# Patient Record
Sex: FEMALE | Race: WHITE
[De-identification: names, ages, dates, MRNs, and addresses within clinical notes are randomized per-mention and may not be internally consistent; named-entity substitution may affect disease eponyms.]

---

## 2017-01-04 ENCOUNTER — HOSPITAL ENCOUNTER (OUTPATIENT)
Dept: HOSPITAL 45 - C.LABSPEC | Age: 42
Discharge: HOME | End: 2017-01-04
Attending: OBSTETRICS & GYNECOLOGY
Payer: COMMERCIAL

## 2017-01-04 DIAGNOSIS — O09.819: Primary | ICD-10-CM

## 2017-01-04 DIAGNOSIS — Z3A.00: ICD-10-CM

## 2017-01-04 LAB
APPEARANCE UR: CLEAR
BILIRUB UR-MCNC: (no result) MG/DL
COLOR UR: YELLOW
MANUAL MICROSCOPIC REQUIRED?: NO
NITRITE UR QL STRIP: (no result)
PH UR STRIP: 7 [PH] (ref 4.5–7.5)
REVIEW REQ?: NO
SP GR UR STRIP: 1.02 (ref 1–1.03)
URINE BILL WITH OR WITHOUT MIC: (no result)
UROBILINOGEN UR-MCNC: (no result) MG/DL

## 2017-01-06 ENCOUNTER — HOSPITAL ENCOUNTER (OUTPATIENT)
Dept: HOSPITAL 45 - C.PAPS | Age: 42
Discharge: HOME | End: 2017-01-06
Attending: OBSTETRICS & GYNECOLOGY
Payer: COMMERCIAL

## 2017-01-06 ENCOUNTER — HOSPITAL ENCOUNTER (OUTPATIENT)
Dept: HOSPITAL 45 - C.LAB1850 | Age: 42
Discharge: HOME | End: 2017-01-06
Attending: OBSTETRICS & GYNECOLOGY
Payer: COMMERCIAL

## 2017-01-06 DIAGNOSIS — Z12.4: Primary | ICD-10-CM

## 2017-01-06 DIAGNOSIS — O09.521: Primary | ICD-10-CM

## 2017-01-06 LAB
BASOPHILS # BLD: 0.03 K/UL (ref 0–0.2)
BASOPHILS NFR BLD: 0.2 %
COMPLETE: YES
EOSINOPHIL NFR BLD AUTO: 235 K/UL (ref 130–400)
HCT VFR BLD CALC: 38.3 % (ref 37–47)
IG%: 0.2 %
IMM GRANULOCYTES NFR BLD AUTO: 29.2 %
LYMPHOCYTES # BLD: 3.91 K/UL (ref 1.2–3.4)
MCH RBC QN AUTO: 28.8 PG (ref 25–34)
MCHC RBC AUTO-ENTMCNC: 36 G/DL (ref 32–36)
MCV RBC AUTO: 80 FL (ref 80–100)
MONOCYTES NFR BLD: 6 %
NEUTROPHILS # BLD AUTO: 5.2 %
NEUTROPHILS NFR BLD AUTO: 59.2 %
PMV BLD AUTO: 10.6 FL (ref 7.4–10.4)
RBC # BLD AUTO: 4.79 M/UL (ref 4.2–5.4)
WBC # BLD AUTO: 13.41 K/UL (ref 4.8–10.8)

## 2017-01-10 LAB
CHLAMYDIA TRACH RNA***: NOT DETECTED
GC (NEIS GONORRHOEAE)RNA**: NOT DETECTED

## 2017-02-23 ENCOUNTER — HOSPITAL ENCOUNTER (OUTPATIENT)
Dept: HOSPITAL 45 - C.LABBC | Age: 42
Discharge: HOME | End: 2017-02-23
Attending: FAMILY MEDICINE
Payer: COMMERCIAL

## 2017-02-23 DIAGNOSIS — O99.810: Primary | ICD-10-CM

## 2017-02-23 LAB — EST. AVERAGE GLUCOSE BLD GHB EST-MCNC: 105 MG/DL

## 2017-02-24 NOTE — CODING QUERY NO DIAGNOSIS
TREATMENT RENDERED WITHOUT A DIAGNOSIS                                                  



To promote full compliance with coding requirements relating to patient care, physician 
participation is requested in all cases of  uncertainty.  Please assist us with 
providing a diagnosis/symptom for the test(s) below:



A diagnosis/symptom was not documented on your Order.  A valid diagnosis/symptom is 
required to bill all insurances.



**Please remember that we are unable to code a diagnosis of rule out, probable, possible, 
questionable, or suspected.  



Tests that require a diagnosis:



DOS: 2/23/17

* HEMOGLOBIN A1C      DIAGNOSIS:

* VITAMIN B12             DIAGNOSIS:

* VITAMIN D               DIAGNOSIS:





Provider Signature: _____________________________ Date: _________



Thank you  

Hue Hallman

[facility] Information Management

Phone:  [phone]

Fax:  [phone]



Once completed, please kindly fax back to [phone]



For questions please call [phone]

## 2017-05-05 ENCOUNTER — HOSPITAL ENCOUNTER (OUTPATIENT)
Dept: HOSPITAL 45 - C.LABBC | Age: 42
Discharge: HOME | End: 2017-05-05
Attending: FAMILY MEDICINE
Payer: COMMERCIAL

## 2017-05-05 DIAGNOSIS — E03.0: ICD-10-CM

## 2017-05-05 DIAGNOSIS — E11.9: Primary | ICD-10-CM

## 2017-05-05 LAB
ALBUMIN/GLOB SERPL: 0.8 {RATIO} (ref 0.9–2)
ALP SERPL-CCNC: 55 U/L (ref 45–117)
ALT SERPL-CCNC: 24 U/L (ref 12–78)
ANION GAP SERPL CALC-SCNC: 10 MMOL/L (ref 3–11)
AST SERPL-CCNC: 13 U/L (ref 15–37)
BUN SERPL-MCNC: 11 MG/DL (ref 7–18)
BUN/CREAT SERPL: 14.6 (ref 10–20)
CALCIUM SERPL-MCNC: 9.4 MG/DL (ref 8.5–10.1)
CHLORIDE SERPL-SCNC: 109 MMOL/L (ref 98–107)
CO2 SERPL-SCNC: 21 MMOL/L (ref 21–32)
CREAT SERPL-MCNC: 0.74 MG/DL (ref 0.6–1.2)
EOSINOPHIL NFR BLD AUTO: 219 K/UL (ref 130–400)
GLOBULIN SER-MCNC: 3.5 GM/DL (ref 2.5–4)
GLUCOSE SERPL-MCNC: 86 MG/DL (ref 70–99)
HCT VFR BLD CALC: 40 % (ref 37–47)
MCH RBC QN AUTO: 28.8 PG (ref 25–34)
MCHC RBC AUTO-ENTMCNC: 34.8 G/DL (ref 32–36)
MCV RBC AUTO: 83 FL (ref 80–100)
PMV BLD AUTO: 10.6 FL (ref 7.4–10.4)
POTASSIUM SERPL-SCNC: 4 MMOL/L (ref 3.5–5.1)
RBC # BLD AUTO: 4.82 M/UL (ref 4.2–5.4)
SODIUM SERPL-SCNC: 140 MMOL/L (ref 136–145)
TSH SERPL-ACNC: < 0.005 UIU/ML (ref 0.3–4.5)
WBC # BLD AUTO: 12.39 K/UL (ref 4.8–10.8)

## 2017-05-17 ENCOUNTER — HOSPITAL ENCOUNTER (OUTPATIENT)
Dept: HOSPITAL 45 - C.LABSPEC | Age: 42
Discharge: HOME | End: 2017-05-17
Attending: OBSTETRICS & GYNECOLOGY
Payer: COMMERCIAL

## 2017-05-17 ENCOUNTER — HOSPITAL ENCOUNTER (OUTPATIENT)
Dept: HOSPITAL 45 - C.LAB1850 | Age: 42
Discharge: HOME | End: 2017-05-17
Attending: OBSTETRICS & GYNECOLOGY
Payer: COMMERCIAL

## 2017-05-17 DIAGNOSIS — O09.523: Primary | ICD-10-CM

## 2017-05-17 LAB
APPEARANCE UR: CLEAR
BILIRUB UR-MCNC: (no result) MG/DL
COLOR UR: YELLOW
HCT VFR BLD CALC: 40.5 % (ref 37–47)
MANUAL MICROSCOPIC REQUIRED?: NO
NITRITE UR QL STRIP: (no result)
PH UR STRIP: 5.5 [PH] (ref 4.5–7.5)
REVIEW REQ?: NO
SP GR UR STRIP: 1.02 (ref 1–1.03)
URINE EPITHELIAL CELL AUTO: >30 /LPF (ref 0–5)
UROBILINOGEN UR-MCNC: (no result) MG/DL

## 2017-06-22 ENCOUNTER — HOSPITAL ENCOUNTER (OUTPATIENT)
Dept: HOSPITAL 45 - C.LAB1850 | Age: 42
Discharge: HOME | End: 2017-06-22
Attending: OBSTETRICS & GYNECOLOGY
Payer: COMMERCIAL

## 2017-06-22 DIAGNOSIS — O13.3: Primary | ICD-10-CM

## 2017-06-22 LAB
ALP SERPL-CCNC: 66 U/L (ref 45–117)
ALT SERPL-CCNC: 18 U/L (ref 12–78)
AST SERPL-CCNC: 10 U/L (ref 15–37)
EOSINOPHIL NFR BLD AUTO: 185 K/UL (ref 130–400)
HCT VFR BLD CALC: 40.2 % (ref 37–47)
MCH RBC QN AUTO: 27.9 PG (ref 25–34)
MCHC RBC AUTO-ENTMCNC: 34.1 G/DL (ref 32–36)
MCV RBC AUTO: 81.9 FL (ref 80–100)
PMV BLD AUTO: 10.1 FL (ref 7.4–10.4)
RBC # BLD AUTO: 4.91 M/UL (ref 4.2–5.4)
WBC # BLD AUTO: 12.43 K/UL (ref 4.8–10.8)

## 2017-06-28 ENCOUNTER — HOSPITAL ENCOUNTER (OUTPATIENT)
Dept: HOSPITAL 45 - C.LAB1850 | Age: 42
Discharge: HOME | End: 2017-06-28
Attending: OBSTETRICS & GYNECOLOGY
Payer: COMMERCIAL

## 2017-06-28 DIAGNOSIS — O13.3: Primary | ICD-10-CM

## 2017-06-28 LAB
ALP SERPL-CCNC: 87 U/L (ref 45–117)
ALT SERPL-CCNC: 21 U/L (ref 12–78)
AST SERPL-CCNC: 14 U/L (ref 15–37)
CREAT SERPL-MCNC: 0.86 MG/DL (ref 0.6–1.2)
EOSINOPHIL NFR BLD AUTO: 197 K/UL (ref 130–400)
HCT VFR BLD CALC: 39 % (ref 37–47)
MCH RBC QN AUTO: 28.2 PG (ref 25–34)
MCHC RBC AUTO-ENTMCNC: 34.9 G/DL (ref 32–36)
MCV RBC AUTO: 80.7 FL (ref 80–100)
PMV BLD AUTO: 10.3 FL (ref 7.4–10.4)
RBC # BLD AUTO: 4.83 M/UL (ref 4.2–5.4)
URATE SERPL-MCNC: 3.8 MG/DL (ref 2.6–7.2)
WBC # BLD AUTO: 11.74 K/UL (ref 4.8–10.8)

## 2017-07-05 ENCOUNTER — HOSPITAL ENCOUNTER (OUTPATIENT)
Dept: HOSPITAL 45 - C.LAB1850 | Age: 42
Discharge: HOME | End: 2017-07-05
Attending: OBSTETRICS & GYNECOLOGY
Payer: COMMERCIAL

## 2017-07-05 DIAGNOSIS — O13.3: Primary | ICD-10-CM

## 2017-07-05 LAB
ALP SERPL-CCNC: 75 U/L (ref 45–117)
ALT SERPL-CCNC: 18 U/L (ref 12–78)
AST SERPL-CCNC: 12 U/L (ref 15–37)
EOSINOPHIL NFR BLD AUTO: 175 K/UL (ref 130–400)
HCT VFR BLD CALC: 38.4 % (ref 37–47)
MCH RBC QN AUTO: 28.6 PG (ref 25–34)
MCHC RBC AUTO-ENTMCNC: 34.9 G/DL (ref 32–36)
MCV RBC AUTO: 81.9 FL (ref 80–100)
PMV BLD AUTO: 10.9 FL (ref 7.4–10.4)
RBC # BLD AUTO: 4.69 M/UL (ref 4.2–5.4)
WBC # BLD AUTO: 10.42 K/UL (ref 4.8–10.8)

## 2017-07-12 ENCOUNTER — HOSPITAL ENCOUNTER (OUTPATIENT)
Dept: HOSPITAL 45 - C.LAB1850 | Age: 42
Discharge: HOME | End: 2017-07-12
Attending: OBSTETRICS & GYNECOLOGY
Payer: COMMERCIAL

## 2017-07-12 DIAGNOSIS — O13.3: Primary | ICD-10-CM

## 2017-07-12 LAB
ALT SERPL-CCNC: 17 U/L (ref 12–78)
AST SERPL-CCNC: 12 U/L (ref 15–37)
BASOPHILS # BLD: 0.02 K/UL (ref 0–0.2)
BASOPHILS NFR BLD: 0.2 %
COMPLETE: YES
CREAT SERPL-MCNC: 0.83 MG/DL (ref 0.6–1.2)
EOSINOPHIL NFR BLD AUTO: 167 K/UL (ref 130–400)
HCT VFR BLD CALC: 38.9 % (ref 37–47)
IG%: 0.4 %
IMM GRANULOCYTES NFR BLD AUTO: 24.8 %
LYMPHOCYTES # BLD: 2.7 K/UL (ref 1.2–3.4)
MCH RBC QN AUTO: 28.2 PG (ref 25–34)
MCHC RBC AUTO-ENTMCNC: 34.4 G/DL (ref 32–36)
MCV RBC AUTO: 81.9 FL (ref 80–100)
MONOCYTES NFR BLD: 6 %
NEUTROPHILS # BLD AUTO: 1.3 %
NEUTROPHILS NFR BLD AUTO: 67.3 %
PMV BLD AUTO: 11 FL (ref 7.4–10.4)
RBC # BLD AUTO: 4.75 M/UL (ref 4.2–5.4)
URATE SERPL-MCNC: 4.2 MG/DL (ref 2.6–7.2)
WBC # BLD AUTO: 10.87 K/UL (ref 4.8–10.8)

## 2017-07-18 ENCOUNTER — HOSPITAL ENCOUNTER (OUTPATIENT)
Dept: HOSPITAL 45 - C.OPB | Age: 42
Discharge: HOME | End: 2017-07-18
Attending: OBSTETRICS & GYNECOLOGY
Payer: COMMERCIAL

## 2017-07-18 DIAGNOSIS — O26.893: Primary | ICD-10-CM

## 2017-07-18 DIAGNOSIS — Z3A.36: ICD-10-CM

## 2017-07-18 LAB
ALBUMIN/GLOB SERPL: 0.6 {RATIO} (ref 0.9–2)
ALP SERPL-CCNC: 79 U/L (ref 45–117)
ALT SERPL-CCNC: 19 U/L (ref 12–78)
ANION GAP SERPL CALC-SCNC: 7 MMOL/L (ref 3–11)
AST SERPL-CCNC: 12 U/L (ref 15–37)
BASOPHILS # BLD: 0.01 K/UL (ref 0–0.2)
BASOPHILS NFR BLD: 0.1 %
BUN SERPL-MCNC: 11 MG/DL (ref 7–18)
BUN/CREAT SERPL: 13.7 (ref 10–20)
CALCIUM SERPL-MCNC: 8.8 MG/DL (ref 8.5–10.1)
CHLORIDE SERPL-SCNC: 109 MMOL/L (ref 98–107)
CO2 SERPL-SCNC: 24 MMOL/L (ref 21–32)
COMPLETE: YES
CREAT SERPL-MCNC: 0.79 MG/DL (ref 0.6–1.2)
EOSINOPHIL NFR BLD AUTO: 146 K/UL (ref 130–400)
GLOBULIN SER-MCNC: 3.7 GM/DL (ref 2.5–4)
GLUCOSE SERPL-MCNC: 72 MG/DL (ref 70–99)
HCT VFR BLD CALC: 38.3 % (ref 37–47)
IG%: 0.3 %
IMM GRANULOCYTES NFR BLD AUTO: 24.7 %
LYMPHOCYTES # BLD: 2.47 K/UL (ref 1.2–3.4)
MCH RBC QN AUTO: 28.5 PG (ref 25–34)
MCHC RBC AUTO-ENTMCNC: 35 G/DL (ref 32–36)
MCV RBC AUTO: 81.5 FL (ref 80–100)
MONOCYTES NFR BLD: 7.4 %
NEUTROPHILS # BLD AUTO: 1.6 %
NEUTROPHILS NFR BLD AUTO: 65.9 %
PMV BLD AUTO: 10.8 FL (ref 7.4–10.4)
POTASSIUM SERPL-SCNC: 4 MMOL/L (ref 3.5–5.1)
RBC # BLD AUTO: 4.7 M/UL (ref 4.2–5.4)
SODIUM SERPL-SCNC: 140 MMOL/L (ref 136–145)
URATE SERPL-MCNC: 4.1 MG/DL (ref 2.6–7.2)
WBC # BLD AUTO: 10.02 K/UL (ref 4.8–10.8)

## 2017-07-19 ENCOUNTER — HOSPITAL ENCOUNTER (OUTPATIENT)
Dept: HOSPITAL 45 - C.LD | Age: 42
Discharge: HOME | End: 2017-07-19
Attending: OBSTETRICS & GYNECOLOGY
Payer: COMMERCIAL

## 2017-07-19 VITALS
HEIGHT: 67.01 IN | BODY MASS INDEX: 45.99 KG/M2 | WEIGHT: 293 LBS | HEIGHT: 67.01 IN | WEIGHT: 293 LBS | BODY MASS INDEX: 45.99 KG/M2

## 2017-07-19 DIAGNOSIS — O09.523: ICD-10-CM

## 2017-07-19 DIAGNOSIS — Z3A.37: ICD-10-CM

## 2017-07-19 DIAGNOSIS — O13.3: Primary | ICD-10-CM

## 2017-07-20 ENCOUNTER — HOSPITAL ENCOUNTER (INPATIENT)
Dept: HOSPITAL 45 - C.LD | Age: 42
LOS: 2 days | Discharge: HOME | End: 2017-07-22
Attending: OBSTETRICS & GYNECOLOGY | Admitting: OBSTETRICS & GYNECOLOGY
Payer: COMMERCIAL

## 2017-07-20 VITALS
BODY MASS INDEX: 45.99 KG/M2 | HEIGHT: 67.01 IN | WEIGHT: 293 LBS | HEIGHT: 67.01 IN | WEIGHT: 293 LBS | BODY MASS INDEX: 45.99 KG/M2

## 2017-07-20 DIAGNOSIS — O24.414: ICD-10-CM

## 2017-07-20 DIAGNOSIS — O13.3: Primary | ICD-10-CM

## 2017-07-20 DIAGNOSIS — O09.523: ICD-10-CM

## 2017-07-20 DIAGNOSIS — Z3A.37: ICD-10-CM

## 2017-07-20 LAB
ALBUMIN/GLOB SERPL: 0.7 {RATIO} (ref 0.9–2)
ALP SERPL-CCNC: 79 U/L (ref 45–117)
ALT SERPL-CCNC: 21 U/L (ref 12–78)
ANION GAP SERPL CALC-SCNC: 7 MMOL/L (ref 3–11)
AST SERPL-CCNC: 16 U/L (ref 15–37)
BUN SERPL-MCNC: 12 MG/DL (ref 7–18)
BUN/CREAT SERPL: 15.1 (ref 10–20)
CALCIUM SERPL-MCNC: 8.6 MG/DL (ref 8.5–10.1)
CHLORIDE SERPL-SCNC: 111 MMOL/L (ref 98–107)
CO2 SERPL-SCNC: 23 MMOL/L (ref 21–32)
CREAT SERPL-MCNC: 0.81 MG/DL (ref 0.6–1.2)
EOSINOPHIL NFR BLD AUTO: 161 K/UL (ref 130–400)
GLOBULIN SER-MCNC: 3.5 GM/DL (ref 2.5–4)
GLUCOSE SERPL-MCNC: 72 MG/DL (ref 70–99)
HCT VFR BLD CALC: 38.4 % (ref 37–47)
MCH RBC QN AUTO: 28.3 PG (ref 25–34)
MCHC RBC AUTO-ENTMCNC: 34.4 G/DL (ref 32–36)
MCV RBC AUTO: 82.4 FL (ref 80–100)
PMV BLD AUTO: 10.5 FL (ref 7.4–10.4)
POTASSIUM SERPL-SCNC: 3.9 MMOL/L (ref 3.5–5.1)
RBC # BLD AUTO: 4.66 M/UL (ref 4.2–5.4)
SODIUM SERPL-SCNC: 141 MMOL/L (ref 136–145)
WBC # BLD AUTO: 10.12 K/UL (ref 4.8–10.8)

## 2017-07-20 RX ADMIN — HUMAN INSULIN SCH MLS/HR: 100 INJECTION, SOLUTION SUBCUTANEOUS at 18:36

## 2017-07-20 RX ADMIN — HUMAN INSULIN SCH MLS/HR: 100 INJECTION, SOLUTION SUBCUTANEOUS at 16:26

## 2017-07-20 RX ADMIN — HUMAN INSULIN SCH MLS/HR: 100 INJECTION, SOLUTION SUBCUTANEOUS at 21:35

## 2017-07-20 RX ADMIN — SODIUM CHLORIDE, SODIUM LACTATE, POTASSIUM CHLORIDE, AND CALCIUM CHLORIDE SCH MLS/HR: 600; 310; 30; 20 INJECTION, SOLUTION INTRAVENOUS at 09:59

## 2017-07-20 RX ADMIN — DEXTROSE MONOHYDRATE SCH MLS/HR: 50 INJECTION, SOLUTION INTRAVENOUS at 19:59

## 2017-07-20 RX ADMIN — DEXTROSE MONOHYDRATE SCH MLS/HR: 50 INJECTION, SOLUTION INTRAVENOUS at 09:53

## 2017-07-20 RX ADMIN — HUMAN INSULIN SCH MLS/HR: 100 INJECTION, SOLUTION SUBCUTANEOUS at 11:51

## 2017-07-20 RX ADMIN — SODIUM CHLORIDE, SODIUM LACTATE, POTASSIUM CHLORIDE, AND CALCIUM CHLORIDE SCH MLS/HR: 600; 310; 30; 20 INJECTION, SOLUTION INTRAVENOUS at 23:47

## 2017-07-20 NOTE — MEDICAL STUDENT: [facility]
Med Student History & Physical


Date of Service


2017.





Chief Complaint


Induction of labor





History of Present Illness


Source:  patient, clinic records, hospital records


Margarita Arroyo is a 41 year old  at 37 weeks gestation with JACK of 8/10/17 

via U/S on 16  who presents to labor and delivery today for induction of 

labor.





The prenatal course was complicated by the patient being an elderly 

multigravida with:


1. hypothyroidism, 


2. exercise-induced asthma, 


3. third trimester gestational hypertension without proteinuria, and


4.  gestational diabetes that is insulin controlled.  





She is rubella immune.  She is GBS status not reported. She is Rh negative and 

will need rhogam.  This pregnancy resulted from assisted reproductive 

technology involving ICSI and a 32 yr old donor egg.  She declined a quad 

screen.  A fetal echo at between 22-24 weeks gestation showed probably small 

membranous VSD.  The patient received a growth ultrasound every 4 weeks after 

28 weeks to access fetal growth. Patient received weekly AFIs at 36 weeks and 

weekly NSTs at 32 weeks then NSTs twice a week at 36 weeks. NSTs on 6/15, , 

, ,  were reactive. She received prolonged NST monitoring  and a BPP 

after a nonreactive NST on   at around 36.5 weeks with a BPP score of 8/8. 

It is recommended she deliver between 37-38 weeks gestation due to gestational 

hypertension she developed in the third trimester.  As she has progressed to 

term, there has been an increasingly difficult time monitoring fetal heart rate 

externally.  This is complicated in part due to the patient's obesity.





Yesterday, she recived a cervical balloon dilator.  This morning she received 

25 mcg misoprostol for cervical ripening.  Today, due to difficult to detect 

fetal movement via external heart rate monitors, she was counciled about 

delivery options including , waiting for SROM w/ , or AROM with 

fetal scalp electrode for induction of labor.  After discussion of risks and 

benefits, she opted for AROM with implation of fetal scalp electrode for 

difficult to detect fetal heart rate.





OB History


Patient is a : 01, Operative Vaginal delivery with vacuum at 42 weeks Gestation, Male 

infant 8lbs. 15 oz., comments: epidural anesthesia, GBS+


G2: 03, Spontaneous  at 24 weeks gestation of a nonviable male 

infant with bilateral renal agenesis


G3: 3/14/09, Normal spontaneous vaginal delivery at 38 weeks gestation, Female 

infant 7 lbs.15 oz., comments: Gest. Diabetes Mellitis, Bell's Palsy





GYN History


Menarche age 12, menstrual cycles are irregular, LMP 16 , last pap 2015 

was within normal limits





No infection history of herpes, hepatitisB/C, gonorrhea, chlamydia, HPV, HIV, 

Syphilis, or other STI.





Past Medical History


Patient has a history of Gestational diabetes mellitus, hypothyroidism, MTFHR 

gene mutation, obesity, and infertility (current pregnancy via IVF/ICSI).





Past Surgical History


[place] teeth removal surgery, tonsillectomy





Family History


Family history of Diabetes Mellitus, heart disease, hypertension, high 

cholesterol, liver disease, osteoporosis, fibroids





Social History


non-contributory


Smoking Status:  Never Smoker


Smokeless Tobacco Use:  No


Alcohol Use:  none


Drug Use:  none


Marital Status:  


Housing status:  lives with family


Occupational Status:  employed





Allergies


Coded Allergies:  


     Iodine (Verified  Allergy, Severe, SEAFOOD-ANAPHYLAXIS, 17)





Home Medications


Insulin Human NPH (Humulin N), 110 SC HS


Insulin Lispro (Human) (Humalog), 60 AC


Levothyroxine Sodium (Tirosint), 250


Liothyronine Sodium (Cytomel), 30 MCG PO DAILY





Review of Systems


Contractions at current time are clearly identifiable by patient, but not 

causing distress or change in habitus, breathing, or speech.





Physical Exam


Vital Signs:


recorded 14:35 today: /92, Pulse 73, Resp rate 20, Temp 37.4C (99.3F)


General Appearance:  WD/WN, no apparent distress


Fundal Height:  term


Abdominal: gravid abdomen, fundal height is term


:  Dilation: 2; Effacement 50%; Station: -2 (per Dr. Nelson); fetus lie is 

vertex


Estimated fetal weight: 8lbs 4 oz.





Fetal Monitoring


External Fetal Monitor:


Fetal scalp electrode = fetal heart rate in 130s-140s, moderate variability, 

accels present, no decels. noted at current time, Category 1.


Tocodynamometer:


[place]: 3 contractions/10 minutes





Laboratory Results


17 08:55








17 08:55

















Test


  17


08:55 17


12:36


 


Red Blood Count


  4.66 M/uL


(4.2-5.4) 


 


 


Mean Corpuscular Volume


  82.4 fL


() 


 


 


Mean Corpuscular Hemoglobin


  28.3 pg


(25-34) 


 


 


Mean Corpuscular Hemoglobin


Concent 34.4 g/dl


(32-36) 


 


 


RDW Standard Deviation


  41.6 fL


(36.4-46.3) 


 


 


RDW Coefficient of Variation


  14.0 %


(11.5-14.5) 


 


 


Mean Platelet Volume


  10.5 fL


(7.4-10.4) 


 


 


Anion Gap


  7.0 mmol/L


(3-11) 


 


 


Estimated GFR (


American) 104.6 


  


 


 


Estimated GFR (Non-


American 90.2 


  


 


 


BUN/Creatinine Ratio 15.1 (10-20)  


 


Calcium Level


  8.6 mg/dl


(8.5-10.1) 


 


 


Total Bilirubin


  0.1 mg/dl


(0.2-1) 


 


 


Aspartate Amino Transf


(AST/SGOT) 16 U/L (15-37) 


  


 


 


Alanine Aminotransferase


(ALT/SGPT) 21 U/L (12-78) 


  


 


 


Alkaline Phosphatase


  79 U/L


() 


 


 


Total Protein


  5.9 gm/dl


(6.4-8.2) 


 


 


Albumin


  2.4 gm/dl


(3.4-5.0) 


 


 


Globulin


  3.5 gm/dl


(2.5-4.0) 


 


 


Albumin/Globulin Ratio 0.7 (0.9-2)  


 


Bedside Glucose


  


  60 mg/dl


(70-90)











Assessment and Plan


Assessment:


1.Patient is a 41 year old  at 37 weeks gestation with JACK of 8/10/17 

who presents to labor and delivery today for induction of labor due to 

pregnancy complicated by gestational hypertension in third trimester, with 

difficulty with external fetal heart rate monitoring, as well as,


2. hypothyroidism


3. exercise-induced asthma


4. gestational diabetes that is insulin controlled. 


5. GBS status not reported


6. She is Rh negative


7. A fetal echo at between 22-24 weeks gestation showed probably small 

membranous VSD.  (Pregnancy resulting from assisted reproductive technology 

involving ICSI and donor egg)





Plan:


1. Induce labor at 37 weeks by cervical balloon dilator (already performed), 

25mcg misoprostol (already performed), and AROM with fetal scalp electrode for 

induction of labor


2.  Continue current hypothyroid medications and follow-up with prescribing doc 

post partum


3.  asthma history noted 


4.  Manage blood sugars in normal range by using 5% dextrose IV and 0.5 mg 

Insulin IV as needed, according to protocol


5. IV penicillin G for GBS NR


6.  Noted.  Due for Rhogam within the first 72hrs post-delivery


7.  Noted, watch for fetal distress in labor and during nursery stay

## 2017-07-20 NOTE — MEDICAL STUDENT: [facility]
Med Student OB/GYN Progress Nt


Date of Service


2017.





Subjective


conversation w/ patient, chart review


Ambulation:  limited ambulation (Ambulates to bathroom; has Fetal scalp 

electrode in place)


Voiding:  no voiding problems


Passing Gas:  Yes


Diet Tolerance:  NPO (NPO except ice chips)


Pain:  4/10


Notes:


patient describes a 4/10 pain with her contractions, but she tolerates it very 

well and they do not alter her habitus, breathing, or speech





Review of Systems


Constitutional:  No fever


Cardiac:  No chest pain


Abdomen:  + pain (with contractions 4/10), No nausea, No vomiting


Female :  + see HPI, + urinary frequency


Contractions feel stronger than an hour ago.





Objective


Vital Signs


Vitals as of 4:40 =  /92, Pulse 73, RR 21, Temp 98.0F





Physical Exam


General Appearance:  WELL-APPEARING, WD/WN, uncomfortable


Cardiovascular:  regular rate, rhythm


Extremities:  + pedal edema, + swelling


Gravid Abdomen, Fundal height term


Extremities: Edematous bilateral over hands, feet, lower legs





Fetal Scalp Electrode = heart rate mainly around 135-145 typically 140s, 

moderate variability, accels present, no decels noted, category 1 rhythm strip.


Tocometer: Contractions every 3 minutes.  Same in nature over the last hour.





Laboratory Results





Last 24 Hours








Test


  17


08:55 17


09:38 17


10:37 17


11:44


 


White Blood Count 10.12 K/uL    


 


Red Blood Count 4.66 M/uL    


 


Hemoglobin 13.2 g/dL    


 


Hematocrit 38.4 %    


 


Mean Corpuscular Volume 82.4 fL    


 


Mean Corpuscular Hemoglobin 28.3 pg    


 


Mean Corpuscular Hemoglobin


Concent 34.4 g/dl 


  


  


  


 


 


RDW Standard Deviation 41.6 fL    


 


RDW Coefficient of Variation 14.0 %    


 


Platelet Count 161 K/uL    


 


Mean Platelet Volume 10.5 fL    


 


Sodium Level 141 mmol/L    


 


Potassium Level 3.9 mmol/L    


 


Chloride Level 111 mmol/L    


 


Carbon Dioxide Level 23 mmol/L    


 


Anion Gap 7.0 mmol/L    


 


Blood Urea Nitrogen 12 mg/dl    


 


Creatinine 0.81 mg/dl    


 


Estimated GFR (


American) 104.6 


  


  


  


 


 


Estimated GFR (Non-


American 90.2 


  


  


  


 


 


BUN/Creatinine Ratio 15.1    


 


Random Glucose 72 mg/dl    


 


Calcium Level 8.6 mg/dl    


 


Total Bilirubin 0.1 mg/dl    


 


Aspartate Amino Transf


(AST/SGOT) 16 U/L 


  


  


  


 


 


Alanine Aminotransferase


(ALT/SGPT) 21 U/L 


  


  


  


 


 


Alkaline Phosphatase 79 U/L    


 


Total Protein 5.9 gm/dl    


 


Albumin 2.4 gm/dl    


 


Globulin 3.5 gm/dl    


 


Albumin/Globulin Ratio 0.7    


 


Bedside Glucose  56 mg/dl  58 mg/dl  81 mg/dl 


 


Test


  17


12:36 17


13:39 17


14:37 


 


 


Bedside Glucose 60 mg/dl  65 mg/dl  71 mg/dl  











Medications


See H&P document. On thyroid medication, insulin, and prenatal vitamins.





Assessment and Plan


Continue Routine Care:


Assessment:


1.Patient is a 41 year old  at 37 weeks gestation with JACK of 8/10/17 

who presents to labor and delivery today for induction of labor due to 

pregnancy complicated by gestational hypertension in third trimester, with 

difficulty with external fetal heart rate monitoring, as well as,


2. hypothyroidism


3. exercise-induced asthma


4. gestational diabetes that is insulin controlled. 


5. GBS status CHANGED TO NEGATIVE


6. She is Rh negative


7. A fetal echo at between 22-24 weeks gestation showed probably small 

membranous VSD.  (Pregnancy resulting from assisted reproductive technology 

involving ICSI and donor egg)





Plan:


1. Induce labor at 37 weeks by cervical balloon dilator (already performed), 

25mcg misoprostol (already performed), and AROM with fetal scalp electrode for 

induction of labor, ADMINISTER OXYTOCIN 


2.  Continue current hypothyroid medications and follow-up with prescribing doc 

post partum


3.  asthma history noted 


4.  Manage blood sugars in normal range by using 5% dextrose IV and 0.5 mg 

Insulin IV as needed, according to protocol


5. IV penicillin G DISCONTINUED


6.  Noted.  Due for Rhogam within the first 72hrs post-delivery


7.  Noted, watch for fetal distress in labor and during nursery stay

## 2017-07-21 VITALS
SYSTOLIC BLOOD PRESSURE: 120 MMHG | HEART RATE: 103 BPM | DIASTOLIC BLOOD PRESSURE: 71 MMHG | OXYGEN SATURATION: 99 % | TEMPERATURE: 99.14 F

## 2017-07-21 VITALS
TEMPERATURE: 99.14 F | OXYGEN SATURATION: 98 % | DIASTOLIC BLOOD PRESSURE: 92 MMHG | HEART RATE: 104 BPM | SYSTOLIC BLOOD PRESSURE: 152 MMHG

## 2017-07-21 VITALS
TEMPERATURE: 97.88 F | OXYGEN SATURATION: 98 % | DIASTOLIC BLOOD PRESSURE: 84 MMHG | HEART RATE: 93 BPM | SYSTOLIC BLOOD PRESSURE: 145 MMHG

## 2017-07-21 VITALS
DIASTOLIC BLOOD PRESSURE: 69 MMHG | HEART RATE: 94 BPM | SYSTOLIC BLOOD PRESSURE: 136 MMHG | TEMPERATURE: 98.78 F | OXYGEN SATURATION: 98 %

## 2017-07-21 VITALS — HEART RATE: 99 BPM | SYSTOLIC BLOOD PRESSURE: 130 MMHG | DIASTOLIC BLOOD PRESSURE: 74 MMHG

## 2017-07-21 PROCEDURE — 3E033VJ INTRODUCTION OF OTHER HORMONE INTO PERIPHERAL VEIN, PERCUTANEOUS APPROACH: ICD-10-PCS | Performed by: OBSTETRICS & GYNECOLOGY

## 2017-07-21 PROCEDURE — 0HQ9XZZ REPAIR PERINEUM SKIN, EXTERNAL APPROACH: ICD-10-PCS | Performed by: OBSTETRICS & GYNECOLOGY

## 2017-07-21 RX ADMIN — Medication SCH TAB: at 08:00

## 2017-07-21 RX ADMIN — LEVOTHYROXINE SODIUM SCH MCG: 100 TABLET ORAL at 08:11

## 2017-07-21 RX ADMIN — Medication SCH MCG: at 08:11

## 2017-07-21 RX ADMIN — LEVOTHYROXINE SODIUM SCH MCG: 150 TABLET ORAL at 08:11

## 2017-07-21 RX ADMIN — HUMAN INSULIN SCH MLS/HR: 100 INJECTION, SOLUTION SUBCUTANEOUS at 00:36

## 2017-07-21 RX ADMIN — DOCUSATE SODIUM SCH MG: 100 CAPSULE, LIQUID FILLED ORAL at 19:57

## 2017-07-21 RX ADMIN — DOCUSATE SODIUM SCH MG: 100 CAPSULE, LIQUID FILLED ORAL at 14:28

## 2017-07-21 NOTE — ANESTHESIA PROCEDURE NOTE
Anesthesia Epidural Removal Nt


Date & Time


Jul 21, 2017 at 07:39





Vital Signs


Pain Intensity:  0.0





Notes


Mental Status:  alert / awake / arousable, participated in evaluation


Nausea / Vomiting:  adequately controlled


Pain:  adequately controlled


Airway Patency, RR, SpO2:  stable & adequate


BP & HR:  stable & adequate


Hydration State:  stable & adequate


Neuraxial Anesthesia:  was administered


Anesthetic Complications:  no major complications apparent, pt satisfied with 

anesthetic care


Epidural:  removed without complications, with tip intact

## 2017-07-21 NOTE — [facility] OPERATIVE REPORT
Operative Report


Operative Date


2017.





Pre-Operative Diagnosis





IUP AT 37 0/7


GHTN


A2GDM


IVF PREGNANCY





Post-Operative Diagnosis





SAME





Procedure(s) Performed





GLORIA BULB


CYTOTEC


PITOCIN


AROM


FSE/IUPC








Surgeon


JAILYN





Assistant Surgeon(s)


NONE





Estimated Blood Loss


400CC





Findings


VIABLE FEMALE INFANT IN [place], APGARS 8/9.  DOUBLE NUCHAL CORD.  WEIGHT PENDING.





Fluids


N/A





Specimens





PLACENTA





Drains


NONE





Anesthesia


EPIDURAL





Complication(s)


None





Disposition


L&D





Description of Procedure


PATIENT ADMITTED TO LABOR AND DELIVERY FOR INDUCTION FOR GHTN.  ALSO A2GDM AND 

MAINTAINED ON INSULIN THROUGH LABOR.  GLORIA PLACED THE EVENING BEFORE FELL OUT 

PRIOR TO GOING HOME.  ON ADMISSION GOT ONE CYTOTEC.  THEN AROM WITH FSE AS 

DIFFICULTY TRACING BABY SECONDARY TO MATERNAL HABITUS.  PITOCIN STARTED.  IUPC 

REQUIRED FOR MONITORING.  PATIENT SLOWLY PROGRESSED TO C/C/0 STATION AND FELT 

URGE TO PUSH.  FHT WERE 140S WITH MOD VARIABILITY, VARIABLES WITH CONTRACTIONS.





PATIENT PUSHED EFFECTIVELY TO DELIVER A VIABLE FEMALE INFANT IN [place].  DOUBLE 

NUCHAL CORD NOTED AND REDUCED EASILY.  REST OF THE INFANT WAS THEN DELIVERED 

WITHOUT DIFFICULTY.  NOSE AND MOUTH BULB SUCTIONED AND INFANT PLACED ON THE 

MATERNAL ABDOMEN FOR DRYING AND ATTENTION.  VIGOROUS.  CORD CLAMPED AND CUT AT 

ONE MINUTE OF LIFE.  CORD BLOOD AND SEGMENT OBTAINED.  PLACENTA DELIVERED S/I/

3VC.  CX/S/R/PERINEUM INTACT.  SMALL SPLIT IN THE SKIN AT THE POSTERIOR 

FORCHETTE WAS REPAIRED WITH ONE FIGURE OF EIGHT SUTURE OF 3-0 VICRYL.  

HEMOSTASIS WITH DILUTE PITOCIN AND MASSAGE.  EBL--400CC..  APGARS 8/9.  MOTHER 

AND BABY DOING WELL AT THE END OF THE DELIVERY.


I attest to the content of the Intraoperative Record and any orders documented 

therein.  Any exceptions are noted below.

## 2017-07-22 VITALS
DIASTOLIC BLOOD PRESSURE: 85 MMHG | HEART RATE: 83 BPM | TEMPERATURE: 99.5 F | OXYGEN SATURATION: 98 % | SYSTOLIC BLOOD PRESSURE: 143 MMHG

## 2017-07-22 VITALS — DIASTOLIC BLOOD PRESSURE: 82 MMHG | SYSTOLIC BLOOD PRESSURE: 136 MMHG | TEMPERATURE: 98.78 F | HEART RATE: 78 BPM

## 2017-07-22 LAB — HCT VFR BLD CALC: 36.2 % (ref 37–47)

## 2017-07-22 RX ADMIN — Medication SCH MCG: at 06:22

## 2017-07-22 RX ADMIN — LEVOTHYROXINE SODIUM SCH MCG: 100 TABLET ORAL at 06:23

## 2017-07-22 RX ADMIN — DOCUSATE SODIUM SCH MG: 100 CAPSULE, LIQUID FILLED ORAL at 06:23

## 2017-07-22 RX ADMIN — LEVOTHYROXINE SODIUM SCH MCG: 150 TABLET ORAL at 06:23

## 2017-07-22 RX ADMIN — Medication SCH TAB: at 07:44

## 2017-07-22 NOTE — OB/GYN PROGRESS NOTE
OB/GYN Progress Note


Date of Service


2017.





Subjective


conversation w/ patient, physical exam, chart review, lab review


Ambulation:  ambulating normally


Voiding:  no voiding problems


Passing Gas:  Yes


Diet Tolerance:  Regular Diet


Lochia:  Small


Feeding Type:  Breast Feeding


Pain:  Says some low cramping with breast feeding





Review of Systems


Constitutional:  No fever, No chills


Respiratory:  No cough, No shortness of breath


Cardiac:  No chest pain


Abdomen:  No nausea, No vomiting, No diarrhea


Female :  No dysuria





Objective


Vital Signs











  Date Time  Temp Pulse Resp B/P (MAP) Pulse Ox O2 Delivery O2 Flow Rate FiO2


 


17 03:40 37.5 83 20 143/85 (104) 98 Room Air  


 


17 23:30      Room Air  


 


17 23:30 37.3 103 16 120/71 (87) 99 Room Air  


 


17 20:00 36.6 93 18 145/84 (104) 98 Room Air  


 


17 15:59      Room Air  


 


17 15:55 37.1 94 16 136/69 (91) 98 Room Air  


 


17 11:38  99 20 130/74 (92)    


 


17 11:15 37.3 104 20 152/92 (112) 98 Room Air  











Physical Exam


General Appearance:  WELL-APPEARING, WD/WN, NO APPARENT DISTRESS


Respiratory/Chest:  lungs clear, normal breath sounds


Cardiovascular:  regular rate, rhythm, no edema


Abdomen:  normal bowel sounds, non tender, soft


Fundus:  Firm, Non-Tender, Relation to Umbilicus (Approx at umbilicus)


Extremities:  normal range of motion, non-tender, no calf tenderness, + pedal 

edema, + swelling (Bilateral 2+ LE edema, no difficulty with ankle/foot ROM)





Laboratory Results





Last 24 Hours








Test


  17


06:30


 


Hemoglobin 12.3 g/dL 


 


Hematocrit 36.2 % 











Assessment and Plan


Post-Partum


Day Number:  1


Continue Routine Care:


Resident Physician Supervision Note:





I interviewed and examined the patient. Discussed with Dr. Cota and agree 

with findings and plan as documented in the note. Any exceptions or 

clarifications are listed here: [None]





Documented By:  Christa Carvajal


42yo  s/p , now PPD #1. 


- Blood type B negative. GBS ultimately negative (though did get PCN x 2 in 

interim). Rubella immune. 


- Vital signs reviewed and stable. 


- Pain controlled with PO motrin. 


- Has bilateral leg swelling continued from prepartum but no tenderness on calf 

palpation or foot/ankle ROM. Encourage ambulation. 


- Encourage breast feeding. 


- Hemoglobin prepartum 13.2, post-delivery pending this AM.  Vaginal bleeding 

improving.  Continue to monitor clinically. 


- Continue routine post-vaginal delivery care. Will discuss RhoGAM status at 

rounds. 


- Pt agreed with above plan, all current questions answered. 


Alfa Cota MD, PGY1 Family Medicine








Resident Tracking


Resident Involvement:  Resident Care Provided


Care Provided:  OB Delivery (morning rounds)

## 2017-07-22 NOTE — DISCHARGE INSTRUCTIONS
Discharge Instructions


Date of Service


2017.





Admission


Reason for Admission:  Induction





Discharge


Discharge Diagnosis / Problem:  Recovery from vaginal delivery





Discharge Goals


Goal(s):  Routine recovery after delivery





Medications


Continue Dispensed Medications:  supercream, dermaplast, tucks, lansinoh





Activity Recommendations


Activity Limitations:  per Instructions/Follow-up section





.





Instructions / Follow-Up


Instructions / Follow-Up





ACTIVITY RECOMMENDATIONS:





* Gradual return to full activity over the next 2-3 weeks.


* No lifting - nothing heavier than baby over the next 2-3 weeks.


* Do not engage in vigorous exercise, sexual activity or sports until cleared by


   your physician.


* Do not drive or operate any motorized equipment until cleared by your 

physician.


* You may shower/bathe daily.








MEDICATIONS:





For discomfort or pain, you may use Acetaminophen (Tylenol), Ibuprofen (Advil),


or Naproxen (Aleve) following the package directions. For constipation you may 


use Colace following the package directions.








BREAST CARE:





If you are not breast feeding:





*  Wear a supportive bra 24 hours a day for one to two weeks.


*  Avoid stimulating your breasts and nipples as much as possible during the 

first 


    few weeks after delivery.


*  When taking a shower, have the warm water hit your back, not breasts.


*  When your breasts feel full, apply ice packs.  Usually three to four times a 

day


    helps ease the discomfort.


*  Take a mild pain medication (Tylenol / Motrin) when you are uncomfortable.





If breast feeding:





*  Use breast milk to lubricate nipples.  Lansinoh cream may be used for sore 

nipples. 


    You do not need to remove cream prior to breast feeding.  If using a 

different brand


   of cream, check the label for directions regarding removal of cream prior to 

nursing.


*  Wear a supportive bra.


*  If having problems with breasts or breast feeding, call a lactation 

consultant 


    or your health care provider.








EPISIOTOMY CARE:





After delivery, if you have an episiotomy (stitches), the following steps will 

ease


discomfort and aid healing.





*  For the first 24 hours after delivery, place ice packs next to your 

episiotomy to


   help reduce swelling.


*  After the first 24 hour-period, sitz baths, either portable or in the tub, 

are suggested.


    A shower with a shower arm sprayed over the episiotomy may be comforting.


*  Sera care should be done after each voiding and bowel movement.  Squirt warm 

water


    from a plastic bottle over the perineum (region of the body between the 

anus and 


    urinary opening) and pat dry.


*  Use Dermoplast to ease discomfort.  Shake container.  Spray directly over 

the 


    episiotomy.  Place a Tucks on a clean sanitary pad next to your episiotomy.








SPECIAL CARE INSTRUCTIONS:





When you are discharged from the hospital, it is important for you to follow 

the instructions 


listed below:





*  During the first week at home, you should be able to care for yourself and 

your baby.


    In addition, the usual light household activities are encouraged.





*  Limit your activities to the way you feel.  Do not try to clean the house or 

move 


    furniture. Be sensible.





*  If you actively engage in sports and have done so up until the time of your 

delivery, 


    you may resume these activities as soon as you feel able.  This may take up 

to one 


    month or even longer.  Use good judgment.





*  Continue to take your prenatal vitamins for at least six weeks after the 

birth of your baby.





*  Your diet need not be limited unless you were on a special diet before your 

delivery.  


    Breast-feeding mothers need around 2500 calories per day and at least 64-80 

ounces of 


    fluid per day (8 to 10 glasses).





*  You should eat foods from the four major food groups.  Crash diets or fad 

diets are to be 


    avoided.  Eating lean meats, fresh fruits and vegetables, low-fat dairy 

products, high fiber


    foods and a regular exercise program, will help you get back to your pre-

pregnancy weight


    without putting your health at risk.





*  Constipation is sometimes a problem after delivery.  Take a mild laxative as 

needed.  If 


    breast feeding, Milk of Magnesia is acceptable to use. You may use a 

suppository or Fleets


    enema if no episiotomy.





*  A daily shower or tub bath is suggested.  Be sure to thoroughly and gently 

dry the perineum.





*  A bloody vaginal discharge will usually continue until around four weeks 

post partum.  A 


    small amount of bleeding may continue for as long as six weeks.  Vaginal 

discharge changes


    from the bright red bleeding after delivery to pink then brownish and 

finally yellowish-pink 


    before becoming white and disappearing.





*  Bleeding may increase with activity.  Your first period may come in 4-8 

weeks.  If you are 


    breast feeding, your period may be delayed even longer.





*  [place] (sex) can begin whenever both you and your partner feel 

comfortable and do 


    not have any form of genital infection.  It is recommended that you wait at 

least six weeks


    for internal and external healing to occur.  If you have questions, please 

talk to your health


    care practitioner.  A condom should be used to prevent infection and 

pregnancy.





*  Foreplay, gentle intercourse and lubrication is very important the first 

several times to 


    prevent pain.  A water-based lubricant such as K-Y jelly or Astroglide may 

be used.





*  If you have RH negative blood and your baby is RH positive, you will receive 

RHOGAM by 


    injection prior to discharge.  The nurse will give you a card to keep with 

you that has the


    date and place that you received RHOGAM after delivery.





*  During your prenatal care, you had a Rubella screen done to check for the 

presence of 


    rubella antibodies in your blood.  If your test was negative, you will 

receive a Rubella 


    vaccine prior to discharge.  This vaccine may cause a fever, soreness at 

the injection site


    and flu-like symptoms.  If these symptoms persist, notify your health care 

practitioner.  


    Pregnancy is not advised for one month after a Rubella vaccine.





*  Verbalizes understanding of car seat law as reviewed with patient nursing.





*  Car Seat hand-out given and reviewed with patient by nursing.





*  Shaken baby information reviewed with patient by nursing.


 


Call you doctor if:





*  Heavy bleeding (saturating several pads an hour) or passing clots the size 

of your fist.


*  A fever >101 degrees F (38.3 degrees C) on two occasions four hours apart and

/or chills.


*  Unusual pain in the pelvic or vaginal areas.


* "Baby Blues" lasting longer than two weeks.





If you have any questions or concerns, call your health care practitioner at 


[phone].








FOLLOW UP VISIT:





*  Please call the office at [phone] to schedule a 6 week postpartum 


    examination.  It is important you keep this appointment.  It is important 


    for you to make arrangements for either yearly or twice yearly check-ups 


    thereafter.





Current Hospital Diet


Patient's current hospital diet: Regular OB Diet





Discharge Diet


Recommended Diet:  Regular OB Diet





Procedures


Procedures Performed:  


GLORIA BULB


CYTOTEC


PITOCIN


AROM


FSE/IUPC








Pending Studies


Studies pending at discharge:  no





Medical Emergencies








.


Who to Call and When:





Medical Emergencies:  If at any time you feel your situation is an emergency, 

please call 911 immediately.





.





Non-Emergent Contact


Non-Emergency issues call your:  Gynecologist





.


.








"Provider Documentation" section prepared by Alfa Cota.








.





VTE Core Measure


Inpt VTE Proph given/why not?:  Treatment not indicated

## 2017-09-01 ENCOUNTER — HOSPITAL ENCOUNTER (OUTPATIENT)
Dept: HOSPITAL 45 - C.LABBC | Age: 42
Discharge: HOME | End: 2017-09-01
Attending: FAMILY MEDICINE
Payer: COMMERCIAL

## 2017-09-01 DIAGNOSIS — E03.9: Primary | ICD-10-CM

## 2018-02-02 ENCOUNTER — HOSPITAL ENCOUNTER (OUTPATIENT)
Dept: HOSPITAL 45 - C.LAB1850 | Age: 43
Discharge: HOME | End: 2018-02-02
Attending: FAMILY MEDICINE
Payer: COMMERCIAL

## 2018-02-02 DIAGNOSIS — Z3A.00: ICD-10-CM

## 2018-02-02 DIAGNOSIS — O24.419: Primary | ICD-10-CM

## 2018-02-02 LAB — HBA1C MFR BLD: 5.5 % (ref 4.5–5.6)

## 2018-03-01 ENCOUNTER — HOSPITAL ENCOUNTER (OUTPATIENT)
Dept: HOSPITAL 45 - C.LAB1850 | Age: 43
Discharge: HOME | End: 2018-03-01
Attending: FAMILY MEDICINE
Payer: COMMERCIAL

## 2018-03-01 DIAGNOSIS — R73.09: Primary | ICD-10-CM

## 2018-03-01 DIAGNOSIS — D51.9: ICD-10-CM

## 2018-03-01 DIAGNOSIS — E78.9: ICD-10-CM

## 2018-03-01 DIAGNOSIS — R53.83: ICD-10-CM

## 2018-03-01 DIAGNOSIS — E55.9: ICD-10-CM

## 2018-03-01 LAB
ALBUMIN SERPL-MCNC: 3.9 GM/DL (ref 3.4–5)
ALP SERPL-CCNC: 83 U/L (ref 45–117)
ALT SERPL-CCNC: 32 U/L (ref 12–78)
AST SERPL-CCNC: 12 U/L (ref 15–37)
BUN SERPL-MCNC: 15 MG/DL (ref 7–18)
CALCIUM SERPL-MCNC: 9.2 MG/DL (ref 8.5–10.1)
CO2 SERPL-SCNC: 23 MMOL/L (ref 21–32)
CREAT SERPL-MCNC: 1.03 MG/DL (ref 0.6–1.2)
EOSINOPHIL NFR BLD AUTO: 215 K/UL (ref 130–400)
GLUCOSE SERPL-MCNC: 105 MG/DL (ref 70–99)
HBA1C MFR BLD: 5.7 % (ref 4.5–5.6)
HCT VFR BLD CALC: 41.5 % (ref 37–47)
HGB BLD-MCNC: 14.8 G/DL (ref 12–16)
KETONES UR QL STRIP: 112 MG/DL
MCH RBC QN AUTO: 28.8 PG (ref 25–34)
MCHC RBC AUTO-ENTMCNC: 35.7 G/DL (ref 32–36)
MCV RBC AUTO: 80.7 FL (ref 80–100)
PH UR: 182 MG/DL (ref 0–200)
PMV BLD AUTO: 9.8 FL (ref 7.4–10.4)
POTASSIUM SERPL-SCNC: 4.2 MMOL/L (ref 3.5–5.1)
PROT SERPL-MCNC: 7.6 GM/DL (ref 6.4–8.2)
RED CELL DISTRIBUTION WIDTH CV: 12.7 % (ref 11.5–14.5)
RED CELL DISTRIBUTION WIDTH SD: 37.1 FL (ref 36.4–46.3)
SODIUM SERPL-SCNC: 138 MMOL/L (ref 136–145)
TRANSFERRIN SERPL-MCNC: 254 MG/DL (ref 200–360)
URATE SERPL-MCNC: 5.7 MG/DL (ref 2.6–7.2)
WBC # BLD AUTO: 6.49 K/UL (ref 4.8–10.8)